# Patient Record
Sex: MALE | Race: BLACK OR AFRICAN AMERICAN | NOT HISPANIC OR LATINO | Employment: STUDENT | URBAN - NONMETROPOLITAN AREA
[De-identification: names, ages, dates, MRNs, and addresses within clinical notes are randomized per-mention and may not be internally consistent; named-entity substitution may affect disease eponyms.]

---
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
## 2025-04-30 NOTE — ED PROVIDER NOTES
[facility]    History and Physical - Medicine Service, MAROON TEAM 1       Date of Admission:  4/30/2025    Assessment & Plan      Chantell Kidd is a 60-year-old female with PMHx of insulin-dependent diabetes mellitus after pancreatectomy, chronic pancreatitis, migraine, hypothyroidism, and nomi-en-Y with G tube nutrition with a recent admission for displaced PEG-J and hyperglycemia who is admitted due to abdominal pain, LE edema and found to have hyperglycemia of >700.    # Post-pancreatectomy diabetes (Type 1)  # TPAIT (2012)  Patient presented with BG of 747 with HbA1c of 15.8. Did not have an angion gap. She was previously admitted with BG of 900 with HbA1c of 19.1 (4/15) and was seen by endocrinology inpatient. During that admission there was concern for patient not taking her dose of insulin at that HbA1c. She was discharged on Lantus 3U, carb correction of 1U per 14g, sliding scale insulin as well as NPH 4U for TF which she reported taking, althought frequently had blood glucose above 500 upon checks at home. She was previously seen outpatient by both endocrinology and PCP. Started on insulin gtt and endocrinology was consulted.  - Endocrinology consulted   - Lantus 3U 2 hour prior tostopping IV insulin    - Insulin gtt if BGs >300 x 2 checks.   - Carb coverage 1 units per 20 g CHO, TID AC and PRN with snacks/supplements    - LDSSI  - Hypoglycemia protocol    - Will begin Creon 12,000-38,000 -60,000 unit delayed-release capsule -Take 8 capsules (96,000 units of lipase total) by mouth 3 (three) times per day with meals.   - Plan to consult GI regarding creon dosing, since patient currently receiving continuous Tfs in addition to po feeds.    #Lower extremity edema  Patient presented with bilateral tender LE edema which is present at baseline but has worsened. No prior history of heart failure. No trauma, no erythema or warmth, however, is tender to palpation. LE US  History  Chief Complaint   Patient presents with    Chest Pain - Pediatric     chest pain and headache started yesterday; covid swab negative     Patient presents to the emergency department today for evaluation of chest pain  This is a very pleasant 11-year-old male that presents via private vehicle with his mother  He provides his own history stating yesterday he had some sharp stabbing pain centralized chest that he follow ago I with sleep he woke up in the pain was still there it is mostly central nonradiating it is worse when he takes in a deep breath  He states he does have somewhat of a bilateral frontal headache over the last 2 days but denies cough  He does have nasal congestion  Patient actually did have a negative pediatric echocardiogram within the last month  This was completed secondary to a sports physical   Patient plays high school foot ball  No history of leg pain leg edema  Prior to Admission Medications   Prescriptions Last Dose Informant Patient Reported? Taking?    albuterol (PROVENTIL HFA,VENTOLIN HFA) 90 mcg/act inhaler Not Taking at Unknown time  No No   Sig: Inhale 2 puffs every 6 (six) hours as needed for wheezing   Patient not taking: Reported on 8/10/2021   ergocalciferol (ERGOCALCIFEROL) 1 25 MG (49898 UT) capsule Not Taking at Unknown time  No No   Sig: Take 1 capsule (50,000 Units total) by mouth once a week   Patient not taking: Reported on 8/10/2021   triamcinolone (KENALOG) 0 1 % cream   No No   Sig: Apply topically 2 (two) times a day   Patient not taking: Reported on 8/23/2020      Facility-Administered Medications: None       Past Medical History:   Diagnosis Date    ADD (attention deficit disorder)     ADHD     Asthma     Concussion        Past Surgical History:   Procedure Laterality Date    TONSILLECTOMY         Family History   Problem Relation Age of Onset    No Known Problems Mother      I have reviewed and agree with the history as documented  E-Cigarette/Vaping    E-Cigarette Use Never User      E-Cigarette/Vaping Substances     Social History     Tobacco Use    Smoking status: Never Smoker    Smokeless tobacco: Never Used   Vaping Use    Vaping Use: Never used   Substance Use Topics    Alcohol use: Never    Drug use: Never       Review of Systems   Constitutional: Negative  HENT: Negative  Eyes: Negative  Respiratory: Negative  Cardiovascular: Positive for chest pain  Gastrointestinal: Negative  Endocrine: Negative  Genitourinary: Negative  Musculoskeletal: Negative  Skin: Negative  Allergic/Immunologic: Negative  Neurological: Negative  Hematological: Negative  Psychiatric/Behavioral: Negative  All other systems reviewed and are negative  Physical Exam  Physical Exam  Vitals reviewed  Constitutional:       General: He is not in acute distress  Appearance: He is well-developed  He is obese  He is not ill-appearing, toxic-appearing or diaphoretic  HENT:      Right Ear: External ear normal  No swelling  Tympanic membrane is not bulging  Left Ear: External ear normal  No swelling  Tympanic membrane is not bulging  Nose: Nose normal       Mouth/Throat:      Pharynx: No oropharyngeal exudate  Eyes:      General: Lids are normal       Conjunctiva/sclera: Conjunctivae normal       Pupils: Pupils are equal, round, and reactive to light  Neck:      Thyroid: No thyromegaly  Vascular: No JVD  Trachea: No tracheal deviation  Cardiovascular:      Rate and Rhythm: Normal rate and regular rhythm  Pulses: Normal pulses  Heart sounds: Normal heart sounds  No murmur heard  No friction rub  No gallop  Pulmonary:      Effort: Pulmonary effort is normal  No respiratory distress  Breath sounds: Normal breath sounds  No stridor  No wheezing or rales  Chest:      Chest wall: No tenderness     Abdominal:      General: Bowel sounds are normal  There is no negative for DVT. Prior TTE in our system is from 2016, although patient looks euvolemic on physical exam. Cr normal without proteinuria on UA, reassuring against nephrotic syndrome.  -TTE  -Lymphedema wraps  -Continue PTA lasix 40mg (prescribed by provider in [place])    # Abdominal pain   # PEG tube displacement  # Bloating  # Malnutrition  # Cachexia  Patient recently had PEG-J repositioned by GI on 4/17 after G tube became dislodged with an audible pop. She now has leakage of fluid which she took PO around the site of PEG tube, along with surrounding erythema and abdominal pain. She endorses severe 10/10 lower abdominal pain and back. Lipase <7 with AST/ ALT/ alkaline phosphatase downtrending from prior admission. CTAP showing no acute intrabdominal pathology with PEG-J coiled in the stomach, along with large colonic stool burden. Has been having recurrent admissions for abdominal pain with pain team consulted during prior admissions. Pain could be 2/2 PEG-J displacement vs steatorrhea 2/2 malabsorption.  - GI consulted   - Plan for exchange of PEG-J to PEG + new PEJ placement tentatively on 5/2  - Goal Platelets >50, INR <1.8, hgb >7.0  - Gastric port to gravity  - Hold TF  - RD consulted   - Creon for TF coverage   - Thiamine 100mg x 5-7 days  - Pain plan:   - acetaminophen 975 mg PO q8h              - flexeril 5 mg PO TID PRN              - oxycodone 5 mg PO q6h PRN              - Started gabapentin 100 mg TID    - Continue PTA famotidine, protonix, linaclotide, reglan and sucralfate  - Zofran PRN for nausea     # History of adrenal insufficiency  Followed by Dr. Maher. Previously suppressed AM cortisol and has been on empiric therapy for possible adrenal insufficiency, unclear if central vs transient. Most recent clinic note describes inability to wean steroids due to hypoglycemia episodes.   - Continue PTA hydrocortisone 10mg in the AM and 15mg in the PM    #Hypothyroidism  -Continue PTA  distension  Palpations: Abdomen is soft  There is no mass  Tenderness: There is no abdominal tenderness  There is no guarding or rebound  Negative signs include Hernandez's sign  Hernia: No hernia is present  Musculoskeletal:         General: No tenderness  Normal range of motion  Cervical back: Normal range of motion and neck supple  No edema  Normal range of motion  Lymphadenopathy:      Cervical: No cervical adenopathy  Skin:     General: Skin is warm and dry  Capillary Refill: Capillary refill takes less than 2 seconds  Coloration: Skin is not pale  Findings: No erythema or rash  Neurological:      Mental Status: He is alert and oriented to person, place, and time  GCS: GCS eye subscore is 4  GCS verbal subscore is 5  GCS motor subscore is 6  Cranial Nerves: No cranial nerve deficit  Sensory: No sensory deficit  Deep Tendon Reflexes: Reflexes are normal and symmetric     Psychiatric:         Speech: Speech normal          Behavior: Behavior normal          Vital Signs  ED Triage Vitals [09/14/21 1550]   Temperature Pulse Respirations Blood Pressure SpO2   98 6 °F (37 °C) 85 18 (!) 135/78 98 %      Temp src Heart Rate Source Patient Position - Orthostatic VS BP Location FiO2 (%)   Temporal Monitor Sitting Right arm --      Pain Score       Worst Possible Pain           Vitals:    09/14/21 1550 09/14/21 1630   BP: (!) 135/78 (!) 140/85   Pulse: 85 83   Patient Position - Orthostatic VS: Sitting Lying         Visual Acuity  Visual Acuity      Most Recent Value   L Pupil Size (mm)  2   R Pupil Size (mm)  2          ED Medications  Medications - No data to display    Diagnostic Studies  Results Reviewed     Procedure Component Value Units Date/Time    Comprehensive metabolic panel [[id number]] Collected: 09/14/21 1629    Lab Status: Final result Specimen: Blood from Arm, Left Updated: 09/14/21 1701     Sodium 141 mmol/L      Potassium 4 7 mmol/L      Chloride levothyroxine    #MDD  -Continue PTA duloxetine     Diet:  Regular. Hold TF  DVT Prophylaxis: Pneumatic Compression Devices  Mckee Catheter: Not present  Fluids: None  Lines: PRESENT      PICC 04/30/25 Double Lumen Right Brachial vein medial-Site Assessment: WDL      Cardiac Monitoring: None  Code Status: Full Code      Clinically Significant Risk Factors Present on Admission         # Hyponatremia: Lowest Na = 131 mmol/L in last 2 days, will monitor as appropriate  # Hypochloremia: Lowest Cl = 91 mmol/L in last 2 days, will monitor as appropriate               # Anemia: based on hgb <11        # Severe Malnutrition: based on nutrition assessment and treatment provided per dietitian's recommendations.     # Financial/Environmental Concerns: none         Disposition Plan      Expected Discharge Date: 05/04/2025      Destination: home with family;home with help/services          The patient's care was discussed with the Attending Physician, Dr. Limon .      Aisha Shabbir, MD  Medicine Service, [address]  Securely message with [employer] (more info)  Text page via [facility] Paging/Directory   See signed in provider for up to date coverage information  ______________________________________________________________________    Chief Complaint   Abdominal pain  LE edema    History is obtained from the patient    History of Present Illness   Chantell Kidd is a 60-year-old female with PMHx of insulin-dependent diabetes mellitus after pancreatectomy, chronic pancreatitis, migraine, hypothyroidism, and nomi-en-Y with G tube nutrition with a recent admission for displaced PEG-J and hyperglycemia who is admitted due to abdominal pain, LE edema and found to have hyperglycemia of >700.    Patient was recently admitted from 4/15/2025 to 4/24/2025 for PEG tube displacement status post exchange, uncontrolled diabetes and abdominal pain. She endorses BL LE feet, and leg swelling and  103 mmol/L      CO2 30 mmol/L      ANION GAP 8 mmol/L      BUN 8 mg/dL      Creatinine 0 88 mg/dL      Glucose 82 mg/dL      Calcium 9 2 mg/dL      AST 25 U/L      ALT 37 U/L      Alkaline Phosphatase 116 U/L      Total Protein 7 8 g/dL      Albumin 4 2 g/dL      Total Bilirubin 0 43 mg/dL      eGFR --    Narrative:      Notes:     1  eGFR calculation is only valid for adults 18 years and older  2  EGFR calculation cannot be performed for patients who are transgender, non-binary, or whose legal sex, sex at birth, and gender identity differ  Troponin I [[id number]]  (Normal) Collected: 09/14/21 1629    Lab Status: Final result Specimen: Blood from Arm, Left Updated: 09/14/21 1654     Troponin I <0 02 ng/mL     D-Dimer [[id number]]  (Normal) Collected: 09/14/21 1629    Lab Status: Final result Specimen: Blood from Arm, Left Updated: 09/14/21 1652     D-Dimer, Quant <0 27 ug/ml FEU     CBC and differential [[id number]]  (Abnormal) Collected: 09/14/21 1629    Lab Status: Final result Specimen: Blood from Arm, Left Updated: 09/14/21 1638     WBC 11 45 Thousand/uL      RBC 5 84 Million/uL      Hemoglobin 16 0 g/dL      Hematocrit 49 9 %      MCV 85 fL      MCH 27 4 pg      MCHC 32 1 g/dL      RDW 13 3 %      MPV 9 8 fL      Platelets 820 Thousands/uL      nRBC 0 /100 WBCs      Neutrophils Relative 56 %      Immat GRANS % 0 %      Lymphocytes Relative 35 %      Monocytes Relative 7 %      Eosinophils Relative 2 %      Basophils Relative 0 %      Neutrophils Absolute 6 36 Thousands/µL      Immature Grans Absolute 0 02 Thousand/uL      Lymphocytes Absolute 3 97 Thousands/µL      Monocytes Absolute 0 85 Thousand/µL      Eosinophils Absolute 0 20 Thousand/µL      Basophils Absolute 0 05 Thousands/µL     Novel Coronavirus (Covid-19),PCR [facility] - 2 Hour Stat [[id number]] Collected: 09/14/21 1629    Lab Status:  In process Specimen: Nares from Nose Updated: 09/14/21 1636                 XR chest 1 view portable   ED Interpretation pain which began slowly on 04/25 and steadily worsened into 4/26. She has some LE swelling at baseline but endorses that it was worsened. She had no improvement despite 40mg lasix at home. Susyalso complains of right-sided abdominal pain that has now spread to her back that has been also ongoing since Friday. This feels worse than during her prior admission. She was discharged on 5mg oxycodone which would help slightly with the pain, her supply ended on 04/28. She has associated nausea, and vomiting which occurred this morning, along with an episode of fever yesterday. She has been feeling winded with walking. She has been having oily, smelly stool. She denies any worsening of pain with eating, bloody vomiting, black/ tarry strool, chest pain, cough, runny nose, dysuria, or hematuria. She has blurry vision which happens whenever she has hyperglycemia.    She denies smoking, recent alcohol use or any recreational drug use.    Past Medical History    Past Medical History:   Diagnosis Date    Chronic abdominal pain     Chronic pancreatitis (H)     S/P pancreatectomy    Depression with anxiety     Gastro-oesophageal reflux disease     Hypothyroidism 4/23/2015    Kidney stones     Low serum cortisol level     Migraines     Other chronic pain     STOMACH    Other chronic pain     LUMBAR SPINE    Peripheral neuropathy     Post-pancreatectomy diabetes (H) 01/2012    TPIAT    Spasm of sphincter of Oddi        Past Surgical History   Past Surgical History:   Procedure Laterality Date    ARTHROPLASTY CARPOMETACARPAL (THUMB JOINT)  05/02/2014    Procedure: ARTHROPLASTY CARPOMETACARPAL (THUMB JOINT);  Surgeon: Carina Panda MD;  Location: [facility] OR    CHOLECYSTECTOMY  01/01/2004    COLONOSCOPY  07/18/2014    Procedure: COLONOSCOPY;  Surgeon: Aurora Sahu MD;  Location:  GI    COLONOSCOPY N/A 08/01/2017    Procedure: COLONOSCOPY;  Colonoscopy and upper endoscopy;  Surgeon: Deirdre Harris MD;   Location: [facility]    ENDOSCOPIC RETROGRADE CHOLANGIOPANCREATOGRAM      ENDOSCOPIC RETROGRADE CHOLANGIOPANCREATOGRAM  04/19/2011    Procedure:ENDOSCOPIC RETROGRADE CHOLANGIOPANCREATOGRAM; Pancreatic Stent Placement      ENDOSCOPIC RETROGRADE CHOLANGIOPANCREATOGRAM  05/26/2011    Procedure:ENDOSCOPIC RETROGRADE CHOLANGIOPANCREATOGRAM; with Pancreatic Stent Removal; Surgeon:DALE MIMS; Location:[facility]    ENDOSCOPY UPPER, COLONOSCOPY, COMBINED  04/25/2012    Procedure:COMBINED ENDOSCOPY UPPER, COLONOSCOPY; Enteroscopy with Bile Duct Stent Removal, Colonoscopy  *[facility] Safe Room*; Surgeon:GRACY GODWINIQ; Location:[facility]    ESOPHAGOSCOPY, GASTROSCOPY, DUODENOSCOPY (EGD), COMBINED  05/26/2011    Procedure:COMBINED ESOPHAGOSCOPY, GASTROSCOPY, DUODENOSCOPY (EGD); Surgeon:DALE MIMS; [facility]    ESOPHAGOSCOPY, GASTROSCOPY, DUODENOSCOPY (EGD), COMBINED N/A 10/30/2014    Procedure: COMBINED ESOPHAGOSCOPY, GASTROSCOPY, DUODENOSCOPY (EGD), BIOPSY SINGLE OR MULTIPLE;  Surgeon: Sarai Moon MD;  Location: [facility]    ESOPHAGOSCOPY, GASTROSCOPY, DUODENOSCOPY (EGD), COMBINED Left 07/06/2015    Procedure: COMBINED ESOPHAGOSCOPY, GASTROSCOPY, DUODENOSCOPY (EGD), BIOPSY SINGLE OR MULTIPLE;  Surgeon: Thomas Estrada MD;  [facility]: [facility]    ESOPHAGOSCOPY, GASTROSCOPY, DUODENOSCOPY (EGD), COMBINED N/A 07/08/2016    Procedure: COMBINED ESOPHAGOSCOPY, GASTROSCOPY, DUODENOSCOPY (EGD), BIOPSY SINGLE OR MULTIPLE;  Surgeon: Eloy Klein MD;  [facility]: [facility]    ESOPHAGOSCOPY, GASTROSCOPY, DUODENOSCOPY (EGD), COMBINED N/A 08/04/2016    Procedure: COMBINED ESOPHAGOSCOPY, GASTROSCOPY, DUODENOSCOPY (EGD), BIOPSY SINGLE OR MULTIPLE;  Surgeon: Jason Brown MD;  [facility]: [facility]    ESOPHAGOSCOPY, GASTROSCOPY, DUODENOSCOPY (EGD), COMBINED N/A 08/01/2017    Procedure: COMBINED ESOPHAGOSCOPY, GASTROSCOPY, DUODENOSCOPY (EGD);;  Surgeon: Deirdre Harris MD;  Location[facility]     by Han Lee PA-C (09/14 1624)   No evidence of acute cardiopulmonary disease      Final Result by Dorcas Flores MD (09/14 1655)      No acute cardiopulmonary disease  Workstation performed: [id number]                    Procedures  ECG 12 Lead Documentation Only    Date/Time: 9/14/2021 5:23 PM  Performed by: Han Lee PA-C  Authorized by: Han Lee PA-C     Indications / Diagnosis:  Chest pain  ECG reviewed by me, the ED Provider: yes    Patient location:  ED  Previous ECG:     Previous ECG:  Unavailable    Comparison to cardiac monitor: Yes    Interpretation:     Interpretation: normal    Rate:     ECG rate:  69    ECG rate assessment: normal    Rhythm:     Rhythm: sinus rhythm    Ectopy:     Ectopy: none    QRS:     QRS axis:  Normal    QRS intervals:  Normal  Conduction:     Conduction: normal    ST segments:     ST segments:  Normal  T waves:     T waves: normal               ED Course  ED Course as of Sep 14 1726   Tue Sep 14, 2021   1621 Blood Pressure(!): 135/78   1621 Temperature: 98 6 °F (37 °C)   1621 Pulse: 85   1621 Respirations: 18   1621 SpO2: 98 %   1704 D-Dimer, Quant: <0 27   1704 Troponin I: <0 02   1704 Sodium: 141   1704 TOTAL BILIRUBIN: 0 43   1704 WBC(!): 11 45   1704 Hemoglobin: 16 0   1704 Platelet Count: 699   1050 FINDINGS:     Cardiomediastinal silhouette appears unremarkable      The lungs are clear  No pneumothorax or pleural effusion      Osseous structures appear within normal limits for patient age      IMPRESSION:     No acute cardiopulmonary disease  46 Awaiting covid            EMA      Most Recent Value   SBIRT (13-23 yo)   In order to provide better care to our patients, we are screening all of our patients for alcohol and drug use  Would it be okay to ask you these screening questions? Yes Filed at: 09/14/2021 [facility]   EMA Initial Screen: During the past 12 months, did you:   1  Drink any alcohol (more than a few sips)?    No Filed at: 09/14/2021 1552   2  Smoke any marijuana or hashish  No Filed at: 09/14/2021 1552   3  Use anything else to get high? ("anything else" includes illegal drugs, over the counter and prescription drugs, and things that you sniff or 'brasher')? No Filed at: 09/14/2021 1552          HEART Risk Score      Most Recent Value   Heart Score Risk Calculator   History  0 Filed at: 09/14/2021 1724   ECG  0 Filed at: 09/14/2021 1724   Age  0 Filed at: 09/14/2021 1724   Risk Factors  1 Filed at: 09/14/2021 1724   Troponin  0 Filed at: 09/14/2021 1724   HEART Score  1 Filed at: 09/14/2021 1724                                    MDM    Disposition  Final diagnoses:   Chest pain     Time reflects when diagnosis was documented in both MDM as applicable and the Disposition within this note     Time User Action Codes Description Comment    9/14/2021  5:25 PM Azam Mooney Add [R07 9] Chest pain       ED Disposition     ED Disposition Condition Date/Time Comment    Discharge Stable Tue Sep 14, 2021  5:25 PM Sincere Q Flamer discharge to home/self care  Follow-up Information     Follow up With Specialties Details Why Contact Info    Jayy Albright PA-C Pulmonary Disease, Pulmonology, Family Medicine, Physician Assistant, Critical Care Medicine Schedule an appointment as soon as possible for a visit  As needed [address] [place] [address]  [phone]            Patient's Medications   Discharge Prescriptions    No medications on file     No discharge procedures on file      PDMP Review     None          ED Provider  Electronically Signed by           Eric Buckley PA-C  09/14/21 1726 ESOPHAGOSCOPY, GASTROSCOPY, DUODENOSCOPY (EGD), COMBINED N/A 06/12/2019    Procedure: ESOPHAGOGASTRODUODENOSCOPY (EGD);  Surgeon: Jose Francisco Perdomo MD;  Location: [facility]    GYN SURGERY      Hysterectomy and USO    [facility] ENDOSCOPY W EUS  07/20/2011    Procedure:COMBINED ENDOSCOPIC ULTRASOUND, ESOPHAGOSCOPY, GASTROSCOPY, DUODENOSCOPY (EGD); Surgeon:DARVIN DONOHUE; Location[facility]    HERNIORRHAPHY VENTRAL N/A 09/15/2016    Procedure: HERNIORRHAPHY VENTRAL;  Surgeon: Juanita Bernabe MD;  [facility]    HYSTERECTOMY  1997 or 1998    USO    INCISION AND DRAINAGE ABDOMEN WASHOUT, COMBINED  08/16/2012    Procedure: COMBINED INCISION AND DRAINAGE ABDOMEN WASHOUT;  ,debridement and Drainage Post Appendectomy;  Surgeon: Ron Austin MD;  [facility]    INJECT TRANSVERSUS ABDOMINIS PLANE (TAP) BLOCK BILATERAL Bilateral 05/26/2016    Procedure: INJECT TRANSVERSUS ABDOMINIS PLANE (TAP) BLOCK BILATERAL;  Surgeon: Leonard Mccallum MD;  [facility]    IR NG TUBE PLACEMENT REQ RAD & FLUORO  12/04/2017    LAPAROSCOPIC APPENDECTOMY  07/30/2012    Procedure: LAPAROSCOPIC APPENDECTOMY;  Open Appendectomy;  Surgeon: Ron Austin MD;  [facility]    PANCREATECTOMY, TRANSPLANT AUTO ISLET CELL, COMBINED  01/06/2012    Procedure:COMBINED PANCREATECTOMY, TRANSPLANT AUTO ISLET CELL; Total  Pancreatectomy, Auto Islet Transplant, splenectomy, 18fr. transgastric-jejunal feeding tube placement, liver biopsy; Surgeon:PALAK LEE; [facility]    PICC DOUBLE LUMEN PLACEMENT Right 04/19/2025    5FR DL PICC, brachial medial vein. L-38cm, 3cm out.    PICC INSERTION - DOUBLE LUMEN Right 04/30/2025    39-3cm, Medial brachial vein    REPLACE GASTROSTOMY TUBE, PERCUTANEOUS N/A 08/30/2017    Procedure: REPLACE GASTROSTOMY TUBE, PERCUTANEOUS;  GJ Tube Change;  Surgeon: Jose Nath PA-C;  [facility]    SPLENECTOMY         Prior to Admission Medications   Prior to Admission  Medications   Prescriptions Last Dose Informant Patient Reported? Taking?   Acetone, Urine, Test (KETONE TEST) STRP   No No   Si each by In Vitro route as needed (hyperglycemia)   Alcohol Swabs PADS Unknown  No Yes   Sig: Use to swab the area of the injection or tiago as directed Per insurance coverage   CONTOUR NEXT TEST test strip   No No   Sig: USE TO TEST BLOOD SUGAR 6 TIMES DAILY OR AS DIRECTED. Call clinic to schedule follow up appointment.  IMPORTANT   Continuous Glucose  (DEXCOM G7 ) RICARDO Unknown  No Yes   Sig: Use to read blood sugars as per 's instructions.   Continuous Glucose Sensor (DEXCOM G7 SENSOR) MISC Unknown  No Yes   Sig: Change every 10 days.   DULoxetine (CYMBALTA) 30 MG capsule Unknown  No Yes   Sig: Take 1 capsule (30 mg) by mouth daily With 60mg capsule for total dose of 90mg   DULoxetine (CYMBALTA) 60 MG EC capsule Unknown  No Yes   Sig: Take 1 capsule (60 mg) by mouth daily With 30mg capsule for total dose of 90mg   Digestive Enzyme Cartridge (RELIZORB) Device Unknown  No Yes   Sig: Place 1 each (1 Device) into OG Tube 2 times daily. Administer as 1 cartridge every 12 hours   Glucagon (GVOKE HYPOPEN 2-PACK) 1 MG/0.2ML pen Unknown  No Yes   Sig: Inject the contents of 1 device under the skin into lower abdomen, outer thigh, or outer upper arm as needed for hypoglycemia. If no response after 15 minutes, additional 1 mg dose from a new device may be injected while waiting for emergency assistance.   Injection Device for insulin (NOVOPEN ECHO) RICARDO   No No   Sig: Use with   Insulin   Insulin Disposable Pump (OMNIPOD 5 G6 INTRO, GEN 5,) KIT Unknown  No Yes   Si each See Admin Instructions Use continuously to infuse insulin.   [place] 1.5 Peptide Plain 325 mL Unknown  No Yes   Sig: Place 1,080 mLs into J tube daily. Infuse [place] Peptide 1.5 @ 45 ml/hr into J-tube via pump  Water flush: 120 ml tid + an additional 550 ml through flushes or oral  intake  Kcals: 1662  Cartons per day: 3.5   Nutritional Supplements (BOOST HIGH PROTEIN) LIQD Unknown  No Yes   Sig: After above baseline labs are drawn, give: 6 mL/kg to maximum of 360 mL; the beverage is to be consumed within 5 minutes.   Patient taking differently: as needed. After above baseline labs are drawn, give: 6 mL/kg to maximum of 360 mL; the beverage is to be consumed within 5 minutes.   SUMAtriptan (IMITREX) 50 MG tablet Unknown Self No Yes   Sig: Take 1 tablet (50 mg) by mouth at onset of headache for migraine Take 1 Tab by mouth Once as needed for Migraine Headache. May repeat after two hours.  Maximum dose 200 mg/24 hours.   Sharps Container MISC Unknown  No Yes   Sig: Use as directed to dispose of needles, lancets and other sharps   acetaminophen (TYLENOL) 325 MG tablet Unknown  Yes Yes   Sig: Take 3 tablets (975 mg) by mouth every 8 hours   alendronate (FOSAMAX) 70 MG tablet Unknown  No Yes   Sig: Take 1 tablet (70 mg) by mouth every 7 days On Sundays take first thing in the morning with plain water and remain upright for at least 30 minutes and until after first food of the day    Do not restart Fosamax (alendronate) until your difficulty swallowing has resolved and you have finished the entire course of fluconazole (Diflucan).   alum & mag hydroxide-simethicone (MYLANTA/MAALOX) 200-200-25 MG CHEW chewable tablet Unknown  Yes Yes   Sig: Take 1 tablet by mouth 3 times daily as needed for indigestion   amylase-lipase-protease (CREON 12) 96197 units CPEP Unknown  No Yes   Sig: Take 6 to 8 capsules by mouth with meals and take 4 capsules with snacks. Needs up to 25 capsules per day   cyclobenzaprine (FLEXERIL) 5 MG tablet Unknown  No Yes   Sig: May take 1 tablet (5 mg) by mouth 3 times daily as needed for muscle spasms. May also take 1 tablet (5 mg) every evening as needed for muscle spasms.   diclofenac (FLECTOR) 1.3 % Patch Unknown  No Yes   Sig: Place 1 patch onto the skin 2 times daily    diclofenac (VOLTAREN) 1 % GEL Unknown Self Yes Yes   Si g Apply 2 g to skin four times a day as needed (to affected upper extremity joint(s)). Maximum 8g/day per joint, 16g/day total.   dicyclomine (BENTYL) 10 MG capsule Unknown  Yes Yes   Sig: Take 10 mg by mouth every 6 hours   dronabinol (MARINOL) 2.5 MG capsule Unknown  No Yes   Sig: Take 2 capsules (5 mg) by mouth 2 times daily as needed   famotidine (PEPCID) 20 MG tablet Unknown  No Yes   Sig: Take 1 tablet (20 mg) by mouth At Bedtime   gabapentin (NEURONTIN) 100 MG capsule Unknown  No Yes   Sig: Take 1 capsule (100 mg) by mouth 3 times daily.   hydrocortisone (CORTEF) 10 MG tablet Unknown  Yes Yes   Sig: Take 10 mg in the morning and 10 mg along with a 5 mg tablet at bedtime for a total dose of 15 mg at bedtime. Watch for hypoglycemia recurrence.   hydrocortisone (CORTEF) 5 MG tablet Unknown  Yes Yes   Sig: Take 5 mg by mouth At Bedtime along with a 10 mg tablet for a total dose of 15 mg   hydrocortisone sodium succinate PF (SOLU-CORTEF) 100 MG injection Unknown  No Yes   Sig: Inject 100mg (1 mL) into muscle in emergency or unable to take oral hydrocortisone. Go to emergency room if given. ActoVial NDC [address]-51. Note to pharmacy: Provide two 3ml syringes with 23g 1 inch needles.   insulin  UNIT/ML vial Unknown  No Yes   Sig: Inject 4 Units subcutaneously every morning AND 3 Units every evening.   insulin aspart (NOVOPEN ECHO) 100 UNIT/ML cartridge Unknown  No Yes   Sig: As  instructed per physician   insulin aspart (NOVOPEN ECHO) 100 UNIT/ML cartridge Unknown  No Yes   Sig: Use 1 unit per 14 grams carb   insulin glargine (LANTUS PEN) 100 UNIT/ML pen Unknown  No Yes   Sig: Inject 3 Units subcutaneously every evening.   insulin pen needle (PIP PEN NEEDLES 32G X 4MM) 32G X 4 MM miscellaneous Unknown  No Yes   Sig: Use 6 pen needles daily or as directed.   levothyroxine (SYNTHROID/LEVOTHROID) 125 MCG tablet Unknown  Yes Yes   Sig: Take 125 mcg  by mouth every morning (before breakfast).   linaclotide (LINZESS) 145 MCG capsule Unknown  Yes Yes   Sig: Take 145 mcg by mouth every morning (before breakfast) as needed   metoclopramide (REGLAN) 5 MG tablet Unknown  No Yes   Sig: Take 2 tablets (10 mg) by mouth 3 times daily   ondansetron (ZOFRAN) 4 MG tablet Unknown  No Yes   Sig: Take 1 tablet (4 mg) by mouth every 8 hours as needed for nausea   order for DME Unknown  No Yes   Sig: by [employer] route [place], [place]  Ph: [phone]  Fax: [phone]    Nutren 1.5 @ 10 ml/hr with advancement by 10 ml/hr q 8 hours to goal rate of 40 ml/hr.  This will provide 1440 kcals (27 kcal/kg/day), 65 g PRO (1.2 g/kg/day), 730 mL H2O, 169 g CHO and no Fiber daily.      oxyCODONE (ROXICODONE) 5 MG tablet Unknown  No Yes   Sig: Take 1 tablet (5 mg) by mouth every 4 hours as needed for pain for 2 days, THEN 1 tablet (5 mg) every 6 hours as needed for pain for 2 days, THEN 1 tablet (5 mg) every 8 hours as needed for pain.   pantoprazole (PROTONIX) 40 MG EC tablet Unknown  No Yes   Sig: Take 1 tablet (40 mg) by mouth 2 times daily   polyethylene glycol (MIRALAX/GLYCOLAX) packet Unknown Self Yes Yes   Sig: Take 1 packet by mouth 2 times daily.   potassium chloride ER (KLOR-CON M) 20 MEQ CR tablet Unknown  No Yes   Sig: Take 1 tablet (20 mEq) by mouth daily   senna-docusate (SENOKOT-S/PERICOLACE) 8.6-50 MG tablet Unknown  No Yes   Sig: Take 1-2 tablets by mouth daily as needed for constipation   sodium chloride (OCEAN) 0.65 % nasal spray Unknown  No Yes   Sig: Spray 1 spray into both nostrils daily as needed for congestion   sodium chloride, PF, 0.9% PF flush Unknown  No Yes   Sig: Inject 10-40 mLs into catheter every 8 hours. -- Flush J port and G port EACH with 30 ml water every 8 hours -- Flush J port with 30 ml water BEFORE AND AFTER medications to avoid clogging of this tube.   sucralfate (CARAFATE) 1 GM tablet Unknown  No Yes   Sig: Take 1  tablet (1 g) by mouth 4 times daily (before meals and nightly)   topiramate (TOPAMAX) 100 MG tablet Unknown  No Yes   Sig: Take 1 tablet (100 mg) by mouth 2 times daily   traZODone (DESYREL) 100 MG tablet Unknown  No Yes   Sig: TAKE 1-2 TABLETS BY MOUTH AN HOUR BEFORE BEDTIME      Facility-Administered Medications: None         Physical Exam   Vital Signs: Temp: 98.3  F (36.8  C) Temp src: Oral BP: 112/78 Pulse: 78   Resp: 16 SpO2: 99 % O2 Device: None (Room air)    Weight: 0 lbs 0 oz    General Appearance: Alert and oriented x3. Comfortable appearing. Looks older than stated age  Respiratory: Clear to auscultation bilaterally  Cardiovascular: Regular rate and rhythm  GI: soft, tenderness to lower abdominal quadrants with erythema around PEG site  Skin: erythema around PEG site  Other: Tender, 2+ pitting edema on LE upto knees    Medical Decision Making

## 2025-07-09 ENCOUNTER — HOSPITAL ENCOUNTER (EMERGENCY)
Facility: HOSPITAL | Age: 20
Discharge: HOME | End: 2025-07-09
Attending: EMERGENCY MEDICINE
Payer: COMMERCIAL

## 2025-07-09 VITALS
WEIGHT: 313.71 LBS | TEMPERATURE: 96.9 F | OXYGEN SATURATION: 99 % | HEART RATE: 79 BPM | SYSTOLIC BLOOD PRESSURE: 196 MMHG | BODY MASS INDEX: 50.63 KG/M2 | DIASTOLIC BLOOD PRESSURE: 99 MMHG | RESPIRATION RATE: 18 BRPM

## 2025-07-09 DIAGNOSIS — K08.89 PAIN, DENTAL: Primary | ICD-10-CM

## 2025-07-09 PROCEDURE — 99284 EMERGENCY DEPT VISIT MOD MDM: CPT | Performed by: EMERGENCY MEDICINE

## 2025-07-09 PROCEDURE — 99282 EMERGENCY DEPT VISIT SF MDM: CPT

## 2025-07-09 RX ORDER — CHLORHEXIDINE GLUCONATE ORAL RINSE 1.2 MG/ML
15 SOLUTION DENTAL 2 TIMES DAILY
Qty: 120 ML | Refills: 0 | Status: SHIPPED | OUTPATIENT
Start: 2025-07-09

## 2025-07-09 RX ORDER — AMOXICILLIN 875 MG/1
875 TABLET, COATED ORAL 2 TIMES DAILY
Qty: 14 TABLET | Refills: 0 | Status: SHIPPED | OUTPATIENT
Start: 2025-07-09 | End: 2025-07-16

## 2025-07-09 RX ORDER — AMOXICILLIN 250 MG/1
1000 CAPSULE ORAL ONCE
Status: COMPLETED | OUTPATIENT
Start: 2025-07-09 | End: 2025-07-09

## 2025-07-09 RX ADMIN — AMOXICILLIN 1000 MG: 250 CAPSULE ORAL at 22:24

## 2025-07-10 NOTE — ED PROVIDER NOTES
Time reflects when diagnosis was documented in both MDM as applicable and the Disposition within this note       Time User Action Codes Description Comment    7/9/2025 10:21 PM George Saini Add [K08.89] Pain, dental           ED Disposition       ED Disposition   Discharge    Condition   Stable    Date/Time   Wed Jul 9, 2025 10:21 PM    Comment   Sincere Q Flamer discharge to home/self care.                   Assessment & Plan       Medical Decision Making  I reviewed the patient's medical chart, PMHx, prior encounters, medications.    My DDx includes: Dental pain, dental infection, impacted wisdom teeth.  No evidence clinically to suggest dental abscess at this time.    Will provide patient with dental clinic follow-up information.  Currently patient is afebrile, will empirically start antibiotics.  Will start Peridex.  Patient is already taking Tylenol and Motrin at home.  Discharge with strict return precautions.    Risk  Prescription drug management.             Medications   amoxicillin (AMOXIL) capsule 1,000 mg (1,000 mg Oral Given 7/9/25 2224)       ED Risk Strat Scores                    No data recorded        SBIRT 22yo+      Flowsheet Row Most Recent Value   Initial Alcohol Screen: US AUDIT-C     1. How often do you have a drink containing alcohol? 0 Filed at: 07/09/2025 2213   2. How many drinks containing alcohol do you have on a typical day you are drinking?  0 Filed at: 07/09/2025 2213   3a. Male UNDER 65: How often do you have five or more drinks on one occasion? 0 Filed at: 07/09/2025 2213   3b. FEMALE Any Age, or MALE 65+: How often do you have 4 or more drinks on one occassion? 0 Filed at: 07/09/2025 2213   Audit-C Score 0 Filed at: 07/09/2025 2213   SAMY: How many times in the past year have you...    Used an illegal drug or used a prescription medication for non-medical reasons? Never Filed at: 07/09/2025 2213                            History of Present Illness       Chief Complaint    Patient presents with    Dental Pain     Right lower jaw pain going on for few months waiting to a see dentist        Past Medical History[1]   Past Surgical History[2]   Family History[3]   Social History[4]   E-Cigarette/Vaping    E-Cigarette Use Never User       E-Cigarette/Vaping Substances    Nicotine No     THC No     CBD No     Flavoring No     Other No     Unknown No       I have reviewed and agree with the history as documented.     20-year-old male who presents for right sided dental pain.  Patient reports this has been going on for the past several months, however has been gradually worsening.  He does not know if it is upper or lower, just hurts on the whole right side of his mouth.  States that it hurts when he brushes teeth, is sensitive to hot and cold.  No fevers or chills.  Does not have a dentist currently.  ROS otherwise negative.        Review of Systems   Constitutional:  Negative for chills, diaphoresis, fatigue and fever.   HENT:  Positive for dental problem. Negative for congestion and sore throat.    Eyes:  Negative for visual disturbance.   Respiratory:  Negative for cough, chest tightness and shortness of breath.    Cardiovascular:  Negative for chest pain, palpitations and leg swelling.   Gastrointestinal:  Negative for abdominal distention, abdominal pain, constipation, diarrhea, nausea and vomiting.   Genitourinary:  Negative for difficulty urinating and dysuria.   Musculoskeletal:  Negative for arthralgias and myalgias.   Skin:  Negative for rash.   Neurological:  Negative for dizziness, weakness, light-headedness, numbness and headaches.   Psychiatric/Behavioral:  Negative for agitation, behavioral problems and confusion. The patient is not nervous/anxious.    All other systems reviewed and are negative.          Objective       ED Triage Vitals   Temperature Pulse Blood Pressure Respirations SpO2 Patient Position - Orthostatic VS   07/09/25 2209 07/09/25 2209 07/09/25 2209 07/09/25  2209 07/09/25 2209 07/09/25 2209   97.6 °F (36.4 °C) 78 (!) 218/131 14 97 % Lying      Temp Source Heart Rate Source BP Location FiO2 (%) Pain Score    07/09/25 2209 07/09/25 2226 07/09/25 2209 -- 07/09/25 2209    Temporal Monitor Right arm  10 - Worst Possible Pain      Vitals      Date and Time Temp Pulse SpO2 Resp BP Pain Score FACES Pain Rating User   07/09/25 2226 96.9 °F (36.1 °C) 79 99 % 18 196/99 -- -- SS   07/09/25 2209 97.6 °F (36.4 °C) 78 97 % 14 218/131 10 - Worst Possible Pain -- RJP            Physical Exam  Constitutional:       Appearance: He is well-developed.   HENT:      Head: Normocephalic and atraumatic.      Mouth/Throat:      Comments: No obvious source of the patient's symptoms, no obvious cavitation or dental abscess identified.  No significant gum erythema.    Cardiovascular:      Rate and Rhythm: Normal rate and regular rhythm.      Heart sounds: Normal heart sounds. No murmur heard.  Pulmonary:      Effort: Pulmonary effort is normal. No respiratory distress.      Breath sounds: Normal breath sounds.   Abdominal:      General: Bowel sounds are normal. There is no distension.      Palpations: Abdomen is soft.      Tenderness: There is no abdominal tenderness.     Musculoskeletal:         General: No deformity.     Skin:     General: Skin is warm.      Findings: No rash.     Neurological:      Mental Status: He is alert and oriented to person, place, and time.     Psychiatric:         Behavior: Behavior normal.         Thought Content: Thought content normal.         Judgment: Judgment normal.         Results Reviewed       None            No orders to display       Procedures    ED Medication and Procedure Management   Prior to Admission Medications   Prescriptions Last Dose Informant Patient Reported? Taking?   albuterol (PROVENTIL HFA,VENTOLIN HFA) 90 mcg/act inhaler   No No   Sig: Inhale 2 puffs every 6 (six) hours as needed for wheezing   amLODIPine (NORVASC) 5 mg tablet   No No   Sig:  Take 1 tablet (5 mg total) by mouth in the morning.   ergocalciferol (ERGOCALCIFEROL) 1.25 MG (67507 UT) capsule   No No   Sig: Take 1 capsule (50,000 Units total) by mouth once a week   ibuprofen (MOTRIN) 600 mg tablet   No No   Sig: Take 1 tablet (600 mg total) by mouth every 6 (six) hours as needed for mild pain, fever or headaches   ibuprofen (MOTRIN) 600 mg tablet   No No   Sig: Take 1 tablet (600 mg total) by mouth every 6 (six) hours as needed for moderate pain      Facility-Administered Medications: None     Patient's Medications   Discharge Prescriptions    AMOXICILLIN (AMOXIL) 875 MG TABLET    Take 1 tablet (875 mg total) by mouth 2 (two) times a day for 7 days       Start Date: 7/9/2025  End Date: 7/16/2025       Order Dose: 875 mg       Quantity: 14 tablet    Refills: 0    CHLORHEXIDINE (PERIDEX) 0.12 % SOLUTION    Apply 15 mL to the mouth or throat 2 (two) times a day       Start Date: 7/9/2025  End Date: --       Order Dose: 15 mL       Quantity: 120 mL    Refills: 0     No discharge procedures on file.  ED SEPSIS DOCUMENTATION   Time reflects when diagnosis was documented in both MDM as applicable and the Disposition within this note       Time User Action Codes Description Comment    7/9/2025 10:21 PM George Saini Add [K08.89] Pain, dental                    [1]   Past Medical History:  Diagnosis Date    ADD (attention deficit disorder)     ADHD     Asthma     Concussion    [2]   Past Surgical History:  Procedure Laterality Date    TONSILLECTOMY     [3]   Family History  Problem Relation Name Age of Onset    Hypertension Mother     [4]   Social History  Tobacco Use    Smoking status: Some Days     Types: Cigarettes    Smokeless tobacco: Never   Vaping Use    Vaping status: Never Used   Substance Use Topics    Alcohol use: Never    Drug use: Yes     Types: Marijuana     Comment: recreational        George Saini MD  07/09/25 [id number]